# Patient Record
Sex: MALE | Race: WHITE | URBAN - METROPOLITAN AREA
[De-identification: names, ages, dates, MRNs, and addresses within clinical notes are randomized per-mention and may not be internally consistent; named-entity substitution may affect disease eponyms.]

---

## 2021-02-01 ENCOUNTER — OFFICE VISIT (OUTPATIENT)
Dept: URBAN - METROPOLITAN AREA CLINIC 115 | Facility: CLINIC | Age: 61
End: 2021-02-01
Payer: MEDICARE

## 2021-02-01 ENCOUNTER — LAB OUTSIDE AN ENCOUNTER (OUTPATIENT)
Dept: URBAN - METROPOLITAN AREA CLINIC 115 | Facility: CLINIC | Age: 61
End: 2021-02-01

## 2021-02-01 ENCOUNTER — WEB ENCOUNTER (OUTPATIENT)
Dept: URBAN - METROPOLITAN AREA CLINIC 115 | Facility: CLINIC | Age: 61
End: 2021-02-01

## 2021-02-01 VITALS
WEIGHT: 230 LBS | DIASTOLIC BLOOD PRESSURE: 80 MMHG | BODY MASS INDEX: 29.52 KG/M2 | HEART RATE: 106 BPM | TEMPERATURE: 96.9 F | HEIGHT: 74 IN | SYSTOLIC BLOOD PRESSURE: 131 MMHG

## 2021-02-01 DIAGNOSIS — D64.9 ANEMIA: ICD-10-CM

## 2021-02-01 DIAGNOSIS — R10.11 RUQ ABDOMINAL PAIN: ICD-10-CM

## 2021-02-01 DIAGNOSIS — D64.89 ANEMIA DUE TO OTHER CAUSE: ICD-10-CM

## 2021-02-01 PROCEDURE — 99244 OFF/OP CNSLTJ NEW/EST MOD 40: CPT | Performed by: INTERNAL MEDICINE

## 2021-02-01 PROCEDURE — 99204 OFFICE O/P NEW MOD 45 MIN: CPT | Performed by: INTERNAL MEDICINE

## 2021-02-01 PROCEDURE — G8482 FLU IMMUNIZE ORDER/ADMIN: HCPCS | Performed by: INTERNAL MEDICINE

## 2021-02-01 RX ORDER — SODIUM, POTASSIUM,MAG SULFATES 17.5-3.13G
354 ML SOLUTION, RECONSTITUTED, ORAL ORAL
Qty: 1 BOX | Refills: 1 | OUTPATIENT
Start: 2021-02-01 | End: 2021-02-03

## 2021-02-01 RX ORDER — BISACODYL 5 MG
2 TABLET, DELAYED RELEASE (ENTERIC COATED) ORAL ONCE A DAY
Qty: 4 | OUTPATIENT
Start: 2021-02-01 | End: 2021-02-03

## 2021-02-01 RX ORDER — PANTOPRAZOLE SODIUM 40 MG/1
1 TABLET TABLET, DELAYED RELEASE ORAL ONCE A DAY
Qty: 30 | Refills: 3 | OUTPATIENT
Start: 2021-02-01

## 2021-02-01 NOTE — HPI-TODAY'S VISIT:
ABDOMINAL PAIN, RUQ, GOING ON FOR 8-10 WEEKS. DR DALY, THERE ALL THE TIMES, BUT WORST IF BAD POSTURE, NO RELATION TO EATING, OR BM. EAT GOOD, BM REGULAR, NO BLEEDING P/R .  LOST LITTLE WT, LOSS OF APPETITE. NAPROSYN ABUSER, STOPPED  JAYUARY 7TH,  NO HEMETEMESIS OR MELANA. NO H/O PUD, DRINK IN PAST, NO BEER FOR 2 MONTHS, NO LIVER PROBLEM. FEVER 102 AT NIGHT 2 WEEKS. AGO. SWEATING ALSO.  STOPPED SMOKING 2 YEARS AGO.   NEVER HAD EGD OR COLONOSCOPY

## 2021-02-02 ENCOUNTER — TELEPHONE ENCOUNTER (OUTPATIENT)
Dept: URBAN - METROPOLITAN AREA CLINIC 92 | Facility: CLINIC | Age: 61
End: 2021-02-02

## 2021-02-02 PROBLEM — 83414005 MACROCYTIC ANEMIA: Status: ACTIVE | Noted: 2021-02-02

## 2021-02-02 LAB
A/G RATIO: 1.1
ALBUMIN: 3.9
ALKALINE PHOSPHATASE: 113
ALT (SGPT): 9
AMYLASE: 21
AST (SGOT): 9
BASO (ABSOLUTE): 0.1
BASOS: 1
BILIRUBIN, TOTAL: 0.8
BUN/CREATININE RATIO: 11
BUN: 10
CALCIUM: 9.1
CARBON DIOXIDE, TOTAL: 22
CHLORIDE: 101
CREATININE: 0.9
EGFR IF AFRICN AM: 105
EGFR IF NONAFRICN AM: 91
EOS (ABSOLUTE): 0.1
EOS: 1
FERRITIN, SERUM: 485
GLOBULIN, TOTAL: 3.6
GLUCOSE: 95
HEMATOCRIT: 31.1
HEMATOLOGY COMMENTS:: (no result)
HEMOGLOBIN: 10.6
IMMATURE CELLS: (no result)
IMMATURE GRANS (ABS): 0
IMMATURE GRANULOCYTES: 0
IRON BIND.CAP.(TIBC): 187
IRON SATURATION: 29
IRON: 55
LIPASE: 22
LYMPHS (ABSOLUTE): 2.4
LYMPHS: 36
MCH: 33.7
MCHC: 34.1
MCV: 99
MONOCYTES(ABSOLUTE): 0.4
MONOCYTES: 6
NEUTROPHILS (ABSOLUTE): 3.8
NEUTROPHILS: 56
NRBC: (no result)
PLATELETS: 282
POTASSIUM: 5.2
PROTEIN, TOTAL: 7.5
RBC: 3.15
RDW: 12.1
SODIUM: 139
UIBC: 132
WBC: 6.7

## 2021-02-11 ENCOUNTER — LAB OUTSIDE AN ENCOUNTER (OUTPATIENT)
Dept: URBAN - METROPOLITAN AREA CLINIC 92 | Facility: CLINIC | Age: 61
End: 2021-02-11

## 2021-02-11 ENCOUNTER — TELEPHONE ENCOUNTER (OUTPATIENT)
Dept: URBAN - METROPOLITAN AREA CLINIC 92 | Facility: CLINIC | Age: 61
End: 2021-02-11

## 2021-02-11 ENCOUNTER — LAB OUTSIDE AN ENCOUNTER (OUTPATIENT)
Dept: URBAN - METROPOLITAN AREA CLINIC 82 | Facility: CLINIC | Age: 61
End: 2021-02-11

## 2021-02-11 PROBLEM — 271737000 ANEMIA: Status: ACTIVE | Noted: 2021-02-01

## 2021-02-11 PROBLEM — 235856003 LIVER DISEASE: Status: ACTIVE | Noted: 2021-02-11

## 2021-02-11 PROBLEM — 301717006 RIGHT UPPER QUADRANT PAIN: Status: ACTIVE | Noted: 2021-02-01

## 2021-02-13 LAB
FOLATE, HEMOLYSATE: 272
FOLATE, RBC: 875
HEMATOCRIT: 31.1
VITAMIN B12: 250

## 2021-02-17 ENCOUNTER — CLAIMS CREATED FROM THE CLAIM WINDOW (OUTPATIENT)
Dept: URBAN - METROPOLITAN AREA CLINIC 4 | Facility: CLINIC | Age: 61
End: 2021-02-17
Payer: MEDICARE

## 2021-02-17 ENCOUNTER — OFFICE VISIT (OUTPATIENT)
Dept: URBAN - METROPOLITAN AREA SURGERY CENTER 13 | Facility: SURGERY CENTER | Age: 61
End: 2021-02-17
Payer: MEDICARE

## 2021-02-17 DIAGNOSIS — R10.11 ABDOMINAL BURNING SENSATION IN RIGHT UPPER QUADRANT: ICD-10-CM

## 2021-02-17 DIAGNOSIS — K31.89 OTHER DISEASES OF STOMACH AND DUODENUM: ICD-10-CM

## 2021-02-17 PROCEDURE — 43239 EGD BIOPSY SINGLE/MULTIPLE: CPT | Performed by: INTERNAL MEDICINE

## 2021-02-17 PROCEDURE — G8907 PT DOC NO EVENTS ON DISCHARG: HCPCS | Performed by: INTERNAL MEDICINE

## 2021-02-17 PROCEDURE — 88312 SPECIAL STAINS GROUP 1: CPT | Performed by: PATHOLOGY

## 2021-02-17 PROCEDURE — 88305 TISSUE EXAM BY PATHOLOGIST: CPT | Performed by: PATHOLOGY

## 2021-02-17 PROCEDURE — 45378 DIAGNOSTIC COLONOSCOPY: CPT | Performed by: INTERNAL MEDICINE

## 2021-02-17 RX ORDER — PANTOPRAZOLE SODIUM 40 MG/1
1 TABLET TABLET, DELAYED RELEASE ORAL ONCE A DAY
Qty: 30 | Refills: 3 | Status: ACTIVE | COMMUNITY
Start: 2021-02-01

## 2021-02-19 ENCOUNTER — LAB OUTSIDE AN ENCOUNTER (OUTPATIENT)
Dept: URBAN - METROPOLITAN AREA CLINIC 82 | Facility: CLINIC | Age: 61
End: 2021-02-19

## 2021-02-19 LAB
CREATININE POC: 0.7
PERFORMING LAB: (no result)

## 2021-10-15 ENCOUNTER — TELEPHONE ENCOUNTER (OUTPATIENT)
Dept: URBAN - METROPOLITAN AREA CLINIC 115 | Facility: CLINIC | Age: 61
End: 2021-10-15

## 2023-11-09 ENCOUNTER — OFFICE VISIT (OUTPATIENT)
Dept: URBAN - METROPOLITAN AREA CLINIC 84 | Facility: CLINIC | Age: 63
End: 2023-11-09
Payer: MEDICARE

## 2023-11-09 ENCOUNTER — DASHBOARD ENCOUNTERS (OUTPATIENT)
Age: 63
End: 2023-11-09

## 2023-11-09 ENCOUNTER — LAB OUTSIDE AN ENCOUNTER (OUTPATIENT)
Dept: URBAN - METROPOLITAN AREA CLINIC 84 | Facility: CLINIC | Age: 63
End: 2023-11-09

## 2023-11-09 VITALS
DIASTOLIC BLOOD PRESSURE: 70 MMHG | HEIGHT: 74 IN | TEMPERATURE: 97.1 F | BODY MASS INDEX: 25.54 KG/M2 | WEIGHT: 199 LBS | HEART RATE: 90 BPM | SYSTOLIC BLOOD PRESSURE: 107 MMHG

## 2023-11-09 DIAGNOSIS — K59.04 CHRONIC IDIOPATHIC CONSTIPATION: ICD-10-CM

## 2023-11-09 DIAGNOSIS — I25.9 CAD (CORONARY ARTERY DISEASE): ICD-10-CM

## 2023-11-09 DIAGNOSIS — C91.10 CLL (CHRONIC LYMPHOCYTIC LEUKEMIA): ICD-10-CM

## 2023-11-09 DIAGNOSIS — R19.4 BOWEL HABIT CHANGES: ICD-10-CM

## 2023-11-09 PROBLEM — 92814006: Status: ACTIVE | Noted: 2023-11-09

## 2023-11-09 PROCEDURE — 99214 OFFICE O/P EST MOD 30 MIN: CPT | Performed by: INTERNAL MEDICINE

## 2023-11-09 RX ORDER — ATORVASTATIN CALCIUM, FILM COATED 10 MG/1
TABLET ORAL
Qty: 30 TABLET | Status: ACTIVE | COMMUNITY

## 2023-11-09 RX ORDER — LEVOTHYROXINE SODIUM 0.12 MG/1
TABLET ORAL
Qty: 90 TABLET | Status: ACTIVE | COMMUNITY

## 2023-11-09 RX ORDER — PANTOPRAZOLE SODIUM 40 MG/1
1 TABLET TABLET, DELAYED RELEASE ORAL ONCE A DAY
Qty: 30 | Refills: 3 | Status: ON HOLD | COMMUNITY
Start: 2021-02-01

## 2023-11-09 RX ORDER — POLYETHYLENE GLYCOL 3350, SODIUM SULFATE ANHYDROUS, SODIUM BICARBONATE, SODIUM CHLORIDE, POTASSIUM CHLORIDE 236; 22.74; 6.74; 5.86; 2.97 G/4L; G/4L; G/4L; G/4L; G/4L
AS DIRECTED POWDER, FOR SOLUTION ORAL ONCE
Qty: 1 | Refills: 0 | OUTPATIENT
Start: 2023-11-09 | End: 2023-11-10

## 2023-11-09 NOTE — HPI-TODAY'S VISIT:
November 2023 visit:EGD and a colonoscopy in February 2021 with Dr. Ander Arreguin.  EGD was normal except small hiatal hernia.  Colonoscopy revealed sigmoid diverticulosis and internal hemorrhoids with no polyps.Gastric biopsy was benign. referred by dr alford - dx CLL / MDS , dx 2 yrs ago.  cardiologist - dr rodriguez constipated since july 2023. needing dulcolax prn. brown stools.  takes nsaids frequently for arthritis.  abdominal feels sore. thinks he has lost 35 lbs over a yr.  Patient has seen general surgery for bilateral inguinal hernia and was felt to be asymptomatic and reducible with no surgical management recommended.CAT scan of the abdomen pelvis with IV contrast mentions stable lymphadenopathy and enlarged spleen.  Abdominal ultrasound mentions liver hemangioma and slightly enlarged spleen.  Patient was referred by his oncologist for further evaluation of abdominal pain and constipation.  He also saw bone marrow transplant team decision was to defer any bone marrow transplantation at this time.

## 2023-11-29 ENCOUNTER — TELEPHONE ENCOUNTER (OUTPATIENT)
Dept: URBAN - METROPOLITAN AREA CLINIC 92 | Facility: CLINIC | Age: 63
End: 2023-11-29

## 2023-11-29 RX ORDER — LINACLOTIDE 145 UG/1
1 CAPSULE AT LEAST 30 MINUTES BEFORE THE FIRST MEAL OF THE DAY ON AN EMPTY STOMACH CAPSULE, GELATIN COATED ORAL ONCE A DAY
Qty: 90 | Refills: 5 | OUTPATIENT
Start: 2023-11-30 | End: 2025-05-23

## 2023-12-19 ENCOUNTER — CLAIMS CREATED FROM THE CLAIM WINDOW (OUTPATIENT)
Dept: URBAN - METROPOLITAN AREA SURGERY CENTER 20 | Facility: SURGERY CENTER | Age: 63
End: 2023-12-19

## 2023-12-19 ENCOUNTER — TELEPHONE ENCOUNTER (OUTPATIENT)
Dept: URBAN - METROPOLITAN AREA CLINIC 25 | Facility: CLINIC | Age: 63
End: 2023-12-19

## 2023-12-19 ENCOUNTER — CLAIMS CREATED FROM THE CLAIM WINDOW (OUTPATIENT)
Dept: URBAN - METROPOLITAN AREA SURGERY CENTER 20 | Facility: SURGERY CENTER | Age: 63
End: 2023-12-19
Payer: MEDICARE

## 2023-12-19 DIAGNOSIS — K64.8 OTHER HEMORRHOIDS: ICD-10-CM

## 2023-12-19 DIAGNOSIS — R19.4 BOWEL HABIT CHANGES: ICD-10-CM

## 2023-12-19 DIAGNOSIS — R19.5 ABNORMAL CONSISTENCY OF STOOL: ICD-10-CM

## 2023-12-19 DIAGNOSIS — R19.4 CHANGE IN BOWEL HABITS: ICD-10-CM

## 2023-12-19 PROCEDURE — G8907 PT DOC NO EVENTS ON DISCHARG: HCPCS | Performed by: INTERNAL MEDICINE

## 2023-12-19 PROCEDURE — 00811 ANES LWR INTST NDSC NOS: CPT | Performed by: NURSE ANESTHETIST, CERTIFIED REGISTERED

## 2023-12-19 PROCEDURE — 45378 DIAGNOSTIC COLONOSCOPY: CPT | Performed by: INTERNAL MEDICINE

## 2023-12-19 RX ORDER — ATORVASTATIN CALCIUM, FILM COATED 10 MG/1
TABLET ORAL
Qty: 30 TABLET | Status: ACTIVE | COMMUNITY

## 2023-12-19 RX ORDER — PANTOPRAZOLE SODIUM 40 MG/1
1 TABLET TABLET, DELAYED RELEASE ORAL ONCE A DAY
Qty: 30 | Refills: 3 | Status: ON HOLD | COMMUNITY
Start: 2021-02-01

## 2023-12-19 RX ORDER — LEVOTHYROXINE SODIUM 0.12 MG/1
TABLET ORAL
Qty: 90 TABLET | Status: ACTIVE | COMMUNITY

## 2023-12-19 RX ORDER — LINACLOTIDE 145 UG/1
1 CAPSULE AT LEAST 30 MINUTES BEFORE THE FIRST MEAL OF THE DAY ON AN EMPTY STOMACH CAPSULE, GELATIN COATED ORAL ONCE A DAY
Qty: 90 | Refills: 5 | Status: ACTIVE | COMMUNITY
Start: 2023-11-30 | End: 2025-05-23

## 2023-12-19 RX ORDER — LUBIPROSTONE 24 UG/1
1 CAPSULE WITH FOOD AND WATER CAPSULE, GELATIN COATED ORAL TWICE A DAY
Qty: 60 | Refills: 5 | OUTPATIENT
Start: 2023-12-19 | End: 2024-06-16

## 2024-01-12 ENCOUNTER — TELEPHONE ENCOUNTER (OUTPATIENT)
Dept: URBAN - METROPOLITAN AREA CLINIC 84 | Facility: CLINIC | Age: 64
End: 2024-01-12

## 2024-01-16 ENCOUNTER — TELEPHONE ENCOUNTER (OUTPATIENT)
Dept: URBAN - METROPOLITAN AREA CLINIC 84 | Facility: CLINIC | Age: 64
End: 2024-01-16

## 2024-01-25 ENCOUNTER — TELEPHONE ENCOUNTER (OUTPATIENT)
Dept: URBAN - METROPOLITAN AREA CLINIC 84 | Facility: CLINIC | Age: 64
End: 2024-01-25